# Patient Record
Sex: MALE | Race: WHITE | Employment: OTHER | URBAN - METROPOLITAN AREA
[De-identification: names, ages, dates, MRNs, and addresses within clinical notes are randomized per-mention and may not be internally consistent; named-entity substitution may affect disease eponyms.]

---

## 2018-01-22 PROBLEM — L02.419 ABSCESS OF ELBOW: Status: ACTIVE | Noted: 2018-01-22

## 2018-01-23 ENCOUNTER — CASE MANAGEMENT (OUTPATIENT)
Dept: EMERGENCY DEPT | Age: 33
End: 2018-01-23

## 2019-08-08 ENCOUNTER — APPOINTMENT (OUTPATIENT)
Dept: GENERAL RADIOLOGY | Age: 34
End: 2019-08-08
Payer: COMMERCIAL

## 2019-08-08 ENCOUNTER — HOSPITAL ENCOUNTER (EMERGENCY)
Age: 34
Discharge: HOME | End: 2019-08-08
Payer: COMMERCIAL

## 2019-08-08 VITALS
SYSTOLIC BLOOD PRESSURE: 119 MMHG | TEMPERATURE: 98.1 F | OXYGEN SATURATION: 98 % | BODY MASS INDEX: 22.93 KG/M2 | WEIGHT: 173 LBS | DIASTOLIC BLOOD PRESSURE: 88 MMHG | RESPIRATION RATE: 18 BRPM | HEIGHT: 73 IN | HEART RATE: 75 BPM

## 2019-08-08 DIAGNOSIS — S80.811A INFECTED ABRASION OF RIGHT LOWER EXTREMITY, INITIAL ENCOUNTER: ICD-10-CM

## 2019-08-08 DIAGNOSIS — L03.115 CELLULITIS OF RIGHT LOWER EXTREMITY: Primary | ICD-10-CM

## 2019-08-08 DIAGNOSIS — R60.0 LEG EDEMA: ICD-10-CM

## 2019-08-08 DIAGNOSIS — S81.811A LACERATION OF RIGHT LOWER EXTREMITY, INITIAL ENCOUNTER: ICD-10-CM

## 2019-08-08 DIAGNOSIS — L08.9 INFECTED ABRASION OF RIGHT LOWER EXTREMITY, INITIAL ENCOUNTER: ICD-10-CM

## 2019-08-08 LAB
A/G RATIO: 1 (ref 1.1–2.2)
ALBUMIN SERPL-MCNC: 4.1 G/DL (ref 3.4–5)
ALP BLD-CCNC: 42 U/L (ref 40–129)
ALT SERPL-CCNC: 92 U/L (ref 10–40)
ANION GAP SERPL CALCULATED.3IONS-SCNC: 11 MMOL/L (ref 3–16)
AST SERPL-CCNC: 73 U/L (ref 15–37)
BASOPHILS ABSOLUTE: 0 K/UL (ref 0–0.2)
BASOPHILS RELATIVE PERCENT: 0.3 %
BILIRUB SERPL-MCNC: 2 MG/DL (ref 0–1)
BUN BLDV-MCNC: 13 MG/DL (ref 7–20)
CALCIUM SERPL-MCNC: 9.4 MG/DL (ref 8.3–10.6)
CHLORIDE BLD-SCNC: 101 MMOL/L (ref 99–110)
CO2: 28 MMOL/L (ref 21–32)
CREAT SERPL-MCNC: 0.7 MG/DL (ref 0.9–1.3)
EOSINOPHILS ABSOLUTE: 0 K/UL (ref 0–0.6)
EOSINOPHILS RELATIVE PERCENT: 0.2 %
GFR AFRICAN AMERICAN: >60
GFR NON-AFRICAN AMERICAN: >60
GLOBULIN: 4.3 G/DL
GLUCOSE BLD-MCNC: 103 MG/DL (ref 70–99)
HCT VFR BLD CALC: 35.1 % (ref 40.5–52.5)
HEMOGLOBIN: 12 G/DL (ref 13.5–17.5)
LACTIC ACID: 0.8 MMOL/L (ref 0.4–2)
LYMPHOCYTES ABSOLUTE: 2.7 K/UL (ref 1–5.1)
LYMPHOCYTES RELATIVE PERCENT: 27.6 %
MCH RBC QN AUTO: 34.2 PG (ref 26–34)
MCHC RBC AUTO-ENTMCNC: 34.3 G/DL (ref 31–36)
MCV RBC AUTO: 99.9 FL (ref 80–100)
MONOCYTES ABSOLUTE: 1.5 K/UL (ref 0–1.3)
MONOCYTES RELATIVE PERCENT: 14.9 %
NEUTROPHILS ABSOLUTE: 5.6 K/UL (ref 1.7–7.7)
NEUTROPHILS RELATIVE PERCENT: 57 %
PDW BLD-RTO: 13 % (ref 12.4–15.4)
PLATELET # BLD: 305 K/UL (ref 135–450)
PMV BLD AUTO: 8.3 FL (ref 5–10.5)
POTASSIUM SERPL-SCNC: 3.8 MMOL/L (ref 3.5–5.1)
RBC # BLD: 3.52 M/UL (ref 4.2–5.9)
SODIUM BLD-SCNC: 140 MMOL/L (ref 136–145)
TOTAL PROTEIN: 8.4 G/DL (ref 6.4–8.2)
WBC # BLD: 9.8 K/UL (ref 4–11)

## 2019-08-08 PROCEDURE — 83605 ASSAY OF LACTIC ACID: CPT

## 2019-08-08 PROCEDURE — 6360000002 HC RX W HCPCS: Performed by: NURSE PRACTITIONER

## 2019-08-08 PROCEDURE — 99283 EMERGENCY DEPT VISIT LOW MDM: CPT

## 2019-08-08 PROCEDURE — 6370000000 HC RX 637 (ALT 250 FOR IP): Performed by: NURSE PRACTITIONER

## 2019-08-08 PROCEDURE — 73590 X-RAY EXAM OF LOWER LEG: CPT

## 2019-08-08 PROCEDURE — 85025 COMPLETE CBC W/AUTO DIFF WBC: CPT

## 2019-08-08 PROCEDURE — 96374 THER/PROPH/DIAG INJ IV PUSH: CPT

## 2019-08-08 PROCEDURE — 80053 COMPREHEN METABOLIC PANEL: CPT

## 2019-08-08 PROCEDURE — 73630 X-RAY EXAM OF FOOT: CPT

## 2019-08-08 RX ORDER — CEPHALEXIN 500 MG/1
500 CAPSULE ORAL 4 TIMES DAILY
Qty: 40 CAPSULE | Refills: 0 | Status: SHIPPED | OUTPATIENT
Start: 2019-08-08 | End: 2019-08-18

## 2019-08-08 RX ORDER — SULFAMETHOXAZOLE AND TRIMETHOPRIM 800; 160 MG/1; MG/1
1 TABLET ORAL 2 TIMES DAILY
Qty: 20 TABLET | Refills: 0 | Status: SHIPPED | OUTPATIENT
Start: 2019-08-08 | End: 2019-08-18

## 2019-08-08 RX ORDER — HYDROCODONE BITARTRATE AND ACETAMINOPHEN 5; 325 MG/1; MG/1
1 TABLET ORAL ONCE
Status: COMPLETED | OUTPATIENT
Start: 2019-08-08 | End: 2019-08-08

## 2019-08-08 RX ORDER — CEPHALEXIN 250 MG/1
500 CAPSULE ORAL ONCE
Status: COMPLETED | OUTPATIENT
Start: 2019-08-08 | End: 2019-08-08

## 2019-08-08 RX ORDER — SULFAMETHOXAZOLE AND TRIMETHOPRIM 800; 160 MG/1; MG/1
1 TABLET ORAL ONCE
Status: COMPLETED | OUTPATIENT
Start: 2019-08-08 | End: 2019-08-08

## 2019-08-08 RX ORDER — HYDROCODONE BITARTRATE AND ACETAMINOPHEN 5; 325 MG/1; MG/1
1 TABLET ORAL EVERY 6 HOURS PRN
Qty: 10 TABLET | Refills: 0 | Status: SHIPPED | OUTPATIENT
Start: 2019-08-08 | End: 2019-08-11

## 2019-08-08 RX ORDER — KETOROLAC TROMETHAMINE 10 MG/1
10 TABLET, FILM COATED ORAL EVERY 6 HOURS PRN
Qty: 20 TABLET | Refills: 0 | Status: SHIPPED | OUTPATIENT
Start: 2019-08-08 | End: 2022-05-28

## 2019-08-08 RX ORDER — KETOROLAC TROMETHAMINE 30 MG/ML
30 INJECTION, SOLUTION INTRAMUSCULAR; INTRAVENOUS ONCE
Status: COMPLETED | OUTPATIENT
Start: 2019-08-08 | End: 2019-08-08

## 2019-08-08 RX ADMIN — HYDROCODONE BITARTRATE AND ACETAMINOPHEN 1 TABLET: 5; 325 TABLET ORAL at 21:39

## 2019-08-08 RX ADMIN — Medication 3 ML: at 19:55

## 2019-08-08 RX ADMIN — SULFAMETHOXAZOLE AND TRIMETHOPRIM 1 TABLET: 800; 160 TABLET ORAL at 21:19

## 2019-08-08 RX ADMIN — KETOROLAC TROMETHAMINE 30 MG: 30 INJECTION, SOLUTION INTRAMUSCULAR at 19:55

## 2019-08-08 RX ADMIN — CEPHALEXIN 500 MG: 250 CAPSULE ORAL at 21:19

## 2019-08-08 ASSESSMENT — PAIN DESCRIPTION - DESCRIPTORS: DESCRIPTORS: ACHING

## 2019-08-08 ASSESSMENT — PAIN DESCRIPTION - ORIENTATION: ORIENTATION: RIGHT;LOWER

## 2019-08-08 ASSESSMENT — PAIN SCALES - GENERAL: PAINLEVEL_OUTOF10: 6

## 2019-08-08 ASSESSMENT — PAIN DESCRIPTION - LOCATION: LOCATION: LEG

## 2019-08-08 ASSESSMENT — PAIN DESCRIPTION - PROGRESSION: CLINICAL_PROGRESSION: GRADUALLY WORSENING

## 2019-08-08 ASSESSMENT — PAIN DESCRIPTION - ONSET: ONSET: ON-GOING

## 2019-08-08 ASSESSMENT — PAIN DESCRIPTION - FREQUENCY: FREQUENCY: CONTINUOUS

## 2019-08-08 NOTE — ED PROVIDER NOTES
Evaluated by [id number] [facility] Provider    [facility]  ED    CHIEF COMPLAINT  Leg Swelling (right leg swelling, states over the weekend was in barn and drywall fell onto right leg, ankle swollen, foot is bruised)    HISTORY OF PRESENT ILLNESS  Cliff Barker III is a 37 y.o. male who presents to the ED with complaints of redness/erythema to the right lower extremity. Was working in a barn and a wall fell on his leg and slid down it causing abrasions and laceration. Sunday it started becoming swollen and red. Reports last tetanus vaccine within the past 5 years. Denies fever, reports chills, denies diaphoresis. REports drainage from the bottom wound. Reports vomiting. History of heroin abuse, states clean for a while. The patient is currently rating their pain as 6/10 and describes it as an aching and throbbing type of pain. Treatments tried prior to arrival in the ED: took doxycycline x2 doses. The patient denies other complaints, modifying factors or associated symptoms. The patient arrived to the ED via private car. Patient is accompanied by family who is/are bedside for the visit. Nursing notes reviewed. Past Medical History:   Diagnosis Date    Active smoker     Drug overdose 07/22/2014    Heroin    Restless leg      Past Surgical History:   Procedure Laterality Date    FINGER SURGERY      SPLENECTOMY, TOTAL Bilateral 03/2015     History reviewed. No pertinent family history.   Social History     Socioeconomic History    Marital status: Single     Spouse name: Not on file    Number of children: Not on file    Years of education: Not on file    Highest education level: Not on file   Occupational History    Not on file   Social Needs    Financial resource strain: Not on file    Food insecurity:     Worry: Not on file     Inability: Not on file    Transportation needs:     Medical: Not on file     Non-medical: Not on file   Tobacco Use    Smoking status: Never consistent when reviewing prior results. Lactic acid level is normal at 0.8. X-ray of the right tibia and fibula shows soft tissue swelling without acute osseous abnormality, right foot also shows no acute findings. The right lower leg wound does not require repair at this time due to length of time since it was originally injured. This area was cleansed and dressed per ED nursing staff. I will start the patient on oral Bactrim and Keflex to treat him for cellulitis, I feel discharging him for outpatient treatment is reasonable at this time. Patient does not have a primary care provider, he was given referral upon discharge but I did advise him to follow-up with [facility] in 2 days for a recheck to assure that his symptoms are not worsening. He was given strict ED return precautions. Patient has a history of substance abuse, denies that he is actively using. He was given Toradol here in the ED and reported that this did not help with this pain. I advised him that antibiotics will be the most effective treatment for his pain, after long discussion with the patient I did agreed to discharge him with a short supply of Norco for pain control. Patient was given the following medications or treatments in the ED:   Medications   ketorolac (TORADOL) injection 30 mg (30 mg Intravenous Given 8/8/19 1955)   lidocaine-EPINEPHrine-tetracaine (LET) topical solution 3 mL syringe (3 mLs Topical Given 8/8/19 1955)   cephALEXin (KEFLEX) capsule 500 mg (500 mg Oral Given 8/8/19 2119)   sulfamethoxazole-trimethoprim (BACTRIM DS;SEPTRA DS) 800-160 MG per tablet 1 tablet (1 tablet Oral Given 8/8/19 2119)   HYDROcodone-acetaminophen (NORCO) 5-325 MG per tablet 1 tablet (1 tablet Oral Given 8/8/19 2139)       At this point I do not feel the patient requires further work up and it is reasonable to discharge the patient. Please refer to AVS for further details regarding discharge instructions.     A discussion was

## 2019-09-09 ENCOUNTER — APPOINTMENT (OUTPATIENT)
Dept: GENERAL RADIOLOGY | Age: 34
End: 2019-09-09
Payer: COMMERCIAL

## 2019-09-09 ENCOUNTER — HOSPITAL ENCOUNTER (EMERGENCY)
Age: 34
Discharge: HOME | End: 2019-09-09
Payer: COMMERCIAL

## 2019-09-09 ENCOUNTER — APPOINTMENT (OUTPATIENT)
Dept: CT IMAGING | Age: 34
End: 2019-09-09
Payer: COMMERCIAL

## 2019-09-09 VITALS
TEMPERATURE: 99.6 F | RESPIRATION RATE: 12 BRPM | BODY MASS INDEX: 24.52 KG/M2 | DIASTOLIC BLOOD PRESSURE: 80 MMHG | WEIGHT: 185 LBS | HEIGHT: 73 IN | SYSTOLIC BLOOD PRESSURE: 144 MMHG | HEART RATE: 84 BPM | OXYGEN SATURATION: 99 %

## 2019-09-09 DIAGNOSIS — L03.119 CELLULITIS AND ABSCESS OF LEG: Primary | ICD-10-CM

## 2019-09-09 DIAGNOSIS — L02.419 CELLULITIS AND ABSCESS OF LEG: Primary | ICD-10-CM

## 2019-09-09 DIAGNOSIS — R50.9 ACUTE FEBRILE ILLNESS: ICD-10-CM

## 2019-09-09 LAB
A/G RATIO: 1.1 (ref 1.1–2.2)
ALBUMIN SERPL-MCNC: 3.4 G/DL (ref 3.4–5)
ALP BLD-CCNC: 32 U/L (ref 40–129)
ALT SERPL-CCNC: 69 U/L (ref 10–40)
ANION GAP SERPL CALCULATED.3IONS-SCNC: 9 MMOL/L (ref 3–16)
AST SERPL-CCNC: 52 U/L (ref 15–37)
BASOPHILS ABSOLUTE: 0.2 K/UL (ref 0–0.2)
BASOPHILS RELATIVE PERCENT: 0.9 %
BILIRUB SERPL-MCNC: 1.5 MG/DL (ref 0–1)
BILIRUBIN URINE: NEGATIVE
BLOOD, URINE: NEGATIVE
BUN BLDV-MCNC: 12 MG/DL (ref 7–20)
CALCIUM SERPL-MCNC: 7.5 MG/DL (ref 8.3–10.6)
CHLORIDE BLD-SCNC: 105 MMOL/L (ref 99–110)
CLARITY: CLEAR
CO2: 22 MMOL/L (ref 21–32)
COLOR: YELLOW
CREAT SERPL-MCNC: <0.5 MG/DL (ref 0.9–1.3)
EOSINOPHILS ABSOLUTE: 0 K/UL (ref 0–0.6)
EOSINOPHILS RELATIVE PERCENT: 0.3 %
GFR AFRICAN AMERICAN: >60
GFR NON-AFRICAN AMERICAN: >60
GLOBULIN: 3.1 G/DL
GLUCOSE BLD-MCNC: 75 MG/DL (ref 70–99)
GLUCOSE URINE: NEGATIVE MG/DL
HCT VFR BLD CALC: 40.5 % (ref 40.5–52.5)
HEMOGLOBIN: 13.7 G/DL (ref 13.5–17.5)
KETONES, URINE: NEGATIVE MG/DL
LACTIC ACID: 1.2 MMOL/L (ref 0.4–2)
LEUKOCYTE ESTERASE, URINE: NEGATIVE
LYMPHOCYTES ABSOLUTE: 2.8 K/UL (ref 1–5.1)
LYMPHOCYTES RELATIVE PERCENT: 17.3 %
MCH RBC QN AUTO: 34.3 PG (ref 26–34)
MCHC RBC AUTO-ENTMCNC: 33.8 G/DL (ref 31–36)
MCV RBC AUTO: 101.4 FL (ref 80–100)
MICROSCOPIC EXAMINATION: ABNORMAL
MONOCYTES ABSOLUTE: 1.5 K/UL (ref 0–1.3)
MONOCYTES RELATIVE PERCENT: 9 %
NEUTROPHILS ABSOLUTE: 11.9 K/UL (ref 1.7–7.7)
NEUTROPHILS RELATIVE PERCENT: 72.5 %
NITRITE, URINE: NEGATIVE
PDW BLD-RTO: 13.7 % (ref 12.4–15.4)
PH UA: 8.5 (ref 5–8)
PLATELET # BLD: 323 K/UL (ref 135–450)
PMV BLD AUTO: 8.8 FL (ref 5–10.5)
POTASSIUM SERPL-SCNC: 3.4 MMOL/L (ref 3.5–5.1)
PROTEIN UA: NEGATIVE MG/DL
RBC # BLD: 3.99 M/UL (ref 4.2–5.9)
REASON FOR REJECTION: NORMAL
REJECTED TEST: NORMAL
SODIUM BLD-SCNC: 136 MMOL/L (ref 136–145)
SPECIFIC GRAVITY UA: 1.01 (ref 1–1.03)
TOTAL PROTEIN: 6.5 G/DL (ref 6.4–8.2)
URINE REFLEX TO CULTURE: ABNORMAL
URINE TYPE: ABNORMAL
UROBILINOGEN, URINE: 0.2 E.U./DL
WBC # BLD: 16.4 K/UL (ref 4–11)

## 2019-09-09 PROCEDURE — 99284 EMERGENCY DEPT VISIT MOD MDM: CPT

## 2019-09-09 PROCEDURE — 2580000003 HC RX 258: Performed by: NURSE PRACTITIONER

## 2019-09-09 PROCEDURE — 73701 CT LOWER EXTREMITY W/DYE: CPT

## 2019-09-09 PROCEDURE — 96361 HYDRATE IV INFUSION ADD-ON: CPT

## 2019-09-09 PROCEDURE — 2500000003 HC RX 250 WO HCPCS: Performed by: NURSE PRACTITIONER

## 2019-09-09 PROCEDURE — 96365 THER/PROPH/DIAG IV INF INIT: CPT

## 2019-09-09 PROCEDURE — 6360000004 HC RX CONTRAST MEDICATION: Performed by: NURSE PRACTITIONER

## 2019-09-09 PROCEDURE — 6370000000 HC RX 637 (ALT 250 FOR IP): Performed by: NURSE PRACTITIONER

## 2019-09-09 PROCEDURE — 73590 X-RAY EXAM OF LOWER LEG: CPT

## 2019-09-09 PROCEDURE — 83605 ASSAY OF LACTIC ACID: CPT

## 2019-09-09 PROCEDURE — 80053 COMPREHEN METABOLIC PANEL: CPT

## 2019-09-09 PROCEDURE — 87040 BLOOD CULTURE FOR BACTERIA: CPT

## 2019-09-09 PROCEDURE — 4500000024 HC ED LEVEL 4 PROCEDURE

## 2019-09-09 PROCEDURE — 81003 URINALYSIS AUTO W/O SCOPE: CPT

## 2019-09-09 PROCEDURE — 85025 COMPLETE CBC W/AUTO DIFF WBC: CPT

## 2019-09-09 RX ORDER — 0.9 % SODIUM CHLORIDE 0.9 %
1000 INTRAVENOUS SOLUTION INTRAVENOUS ONCE
Status: COMPLETED | OUTPATIENT
Start: 2019-09-09 | End: 2019-09-09

## 2019-09-09 RX ORDER — IBUPROFEN 600 MG/1
600 TABLET ORAL ONCE
Status: COMPLETED | OUTPATIENT
Start: 2019-09-09 | End: 2019-09-09

## 2019-09-09 RX ORDER — CLINDAMYCIN HYDROCHLORIDE 150 MG/1
300 CAPSULE ORAL 4 TIMES DAILY
Qty: 56 CAPSULE | Refills: 0 | Status: SHIPPED | OUTPATIENT
Start: 2019-09-09 | End: 2019-09-16

## 2019-09-09 RX ORDER — HYDROCODONE BITARTRATE AND ACETAMINOPHEN 5; 325 MG/1; MG/1
1 TABLET ORAL EVERY 6 HOURS PRN
Qty: 6 TABLET | Refills: 0 | Status: SHIPPED | OUTPATIENT
Start: 2019-09-09 | End: 2019-09-12

## 2019-09-09 RX ORDER — ACETAMINOPHEN 500 MG
1000 TABLET ORAL ONCE
Status: COMPLETED | OUTPATIENT
Start: 2019-09-09 | End: 2019-09-09

## 2019-09-09 RX ADMIN — IBUPROFEN 600 MG: 600 TABLET, FILM COATED ORAL at 16:13

## 2019-09-09 RX ADMIN — ACETAMINOPHEN 1000 MG: 500 TABLET ORAL at 16:13

## 2019-09-09 RX ADMIN — IOPAMIDOL 75 ML: 755 INJECTION, SOLUTION INTRAVENOUS at 19:16

## 2019-09-09 RX ADMIN — SODIUM CHLORIDE 1000 ML: 9 INJECTION, SOLUTION INTRAVENOUS at 16:59

## 2019-09-09 RX ADMIN — DEXTROSE MONOHYDRATE 600 MG: 50 INJECTION, SOLUTION INTRAVENOUS at 16:58

## 2019-09-09 ASSESSMENT — ENCOUNTER SYMPTOMS
NAUSEA: 0
WHEEZING: 0
BACK PAIN: 0
COUGH: 0
DIARRHEA: 0
SHORTNESS OF BREATH: 0
COLOR CHANGE: 1
SORE THROAT: 0
ABDOMINAL PAIN: 0
VOMITING: 0

## 2019-09-09 ASSESSMENT — PAIN SCALES - GENERAL
PAINLEVEL_OUTOF10: 8
PAINLEVEL_OUTOF10: 8
PAINLEVEL_OUTOF10: 7

## 2019-09-09 NOTE — ED PROVIDER NOTES
infection. Metabolic panel shows no sniffing letter light disturbances or renal insufficiency. Liver functions are slightly elevated with a total bilirubin of 1.5, ALT of 69 AST of 52. He is hypocalcemic at 7.5. Tib-fib x-ray shows unremarkable tib-fib radiographs, no acute fracture dislocation or foreign body. I then ordered a CT scan to evaluate for possible osteomyelitis specifically because of the elevated white count, fever and tachycardia. However, the CT scan shows no evidence of acute osseous mellitus, I spoke with the patient at length about being admitted for IV antibiotics versus going home. Patient states he has to go to work as he has a father of a little girl. States that he needs his job to make money or he may lose his job. It was strongly suggested the patient be admitted however we discussed the benefits versus risk. Patient agreed that he would not take care of this wound efficiently along with following up with primary care doctor and the wound clinic. However, at this time of no concerns for acute cellulitis or sepsis. I did educate him that he needs to take Tylenol Motrin for the fever and increase his fluids. Therefore, shared medical decision was made between the patient myself we agreed he can be discharged home with outpatient follow-up. He was discharged home with referral to PCP and the wound care clinic, educated to call first thing in the morning for reevaluation. Educated to return immediately to the ER for worsening symptoms. I did instruct him that I would give him only 6 tablets of pain medicine, I educated him that this would put him at risk for relapsing on heroin, and his significant other states that she will only administer tablets when he necessary leads it however they will use Tylenol Motrin instead. He was discharged home with clindamycin and wound care instructions. Educated take clindamycin as prescribed.   Educated return immediately for worsening

## 2019-09-10 NOTE — ED NOTES
Patient removed PIV after stating he has waited long enough for someone to come take it out. Dressing applied per patient.      Bart Snyder RN  09/09/19 1461

## 2019-09-14 LAB
BLOOD CULTURE, ROUTINE: NORMAL
CULTURE, BLOOD 2: NORMAL

## 2022-02-19 ENCOUNTER — APPOINTMENT (OUTPATIENT)
Dept: CT IMAGING | Age: 37
End: 2022-02-19
Payer: COMMERCIAL

## 2022-02-19 ENCOUNTER — HOSPITAL ENCOUNTER (EMERGENCY)
Age: 37
Discharge: HOME | End: 2022-02-19
Attending: STUDENT IN AN ORGANIZED HEALTH CARE EDUCATION/TRAINING PROGRAM
Payer: COMMERCIAL

## 2022-02-19 VITALS
OXYGEN SATURATION: 94 % | TEMPERATURE: 98.4 F | HEIGHT: 73 IN | RESPIRATION RATE: 20 BRPM | BODY MASS INDEX: 23.67 KG/M2 | SYSTOLIC BLOOD PRESSURE: 144 MMHG | WEIGHT: 178.57 LBS | HEART RATE: 135 BPM | DIASTOLIC BLOOD PRESSURE: 95 MMHG

## 2022-02-19 DIAGNOSIS — S00.93XA CONTUSION OF HEAD, UNSPECIFIED PART OF HEAD, INITIAL ENCOUNTER: Primary | ICD-10-CM

## 2022-02-19 PROCEDURE — 99285 EMERGENCY DEPT VISIT HI MDM: CPT

## 2022-02-19 PROCEDURE — 70450 CT HEAD/BRAIN W/O DYE: CPT

## 2022-02-19 PROCEDURE — 6370000000 HC RX 637 (ALT 250 FOR IP): Performed by: STUDENT IN AN ORGANIZED HEALTH CARE EDUCATION/TRAINING PROGRAM

## 2022-02-19 RX ORDER — 0.9 % SODIUM CHLORIDE 0.9 %
2000 INTRAVENOUS SOLUTION INTRAVENOUS ONCE
Status: DISCONTINUED | OUTPATIENT
Start: 2022-02-19 | End: 2022-02-19

## 2022-02-19 RX ORDER — LORAZEPAM 2 MG/ML
2 INJECTION INTRAMUSCULAR ONCE
Status: DISCONTINUED | OUTPATIENT
Start: 2022-02-19 | End: 2022-02-19

## 2022-02-19 RX ORDER — ACETAMINOPHEN 325 MG/1
650 TABLET ORAL ONCE
Status: COMPLETED | OUTPATIENT
Start: 2022-02-19 | End: 2022-02-19

## 2022-02-19 RX ORDER — DIPHENHYDRAMINE HYDROCHLORIDE 50 MG/ML
50 INJECTION INTRAMUSCULAR; INTRAVENOUS ONCE
Status: DISCONTINUED | OUTPATIENT
Start: 2022-02-19 | End: 2022-02-19

## 2022-02-19 RX ADMIN — ACETAMINOPHEN 650 MG: 325 TABLET ORAL at 07:29

## 2022-02-19 ASSESSMENT — PAIN SCALES - GENERAL
PAINLEVEL_OUTOF10: 7
PAINLEVEL_OUTOF10: 7

## 2022-02-19 ASSESSMENT — PAIN DESCRIPTION - PAIN TYPE: TYPE: ACUTE PAIN

## 2022-02-19 ASSESSMENT — PAIN DESCRIPTION - DESCRIPTORS: DESCRIPTORS: ACHING

## 2022-02-19 ASSESSMENT — PAIN DESCRIPTION - LOCATION: LOCATION: HEAD

## 2022-02-19 NOTE — Clinical Note
Juan Nicholson was seen and treated in our emergency department on 2/19/2022. He may return to work on 02/21/2022. If you have any questions or concerns, please don't hesitate to call.       Asuncion Snow MD

## 2022-02-19 NOTE — ED NOTES
Patient states he is unsure when he had his last tetanus shot, states he does not want one today.      Ashleigh Lee RN  02/19/22 2243

## 2022-02-19 NOTE — ED NOTES
Patient noted to have scratched open a sore on the right forehead. Dressing applied at this time.      Libertad Hammonds RN  02/19/22 [id number]

## 2022-02-19 NOTE — ED PROVIDER NOTES
Primary Care Physician: No primary care provider on file. Attending Physician: No att. providers found     History   Chief Complaint   Patient presents with    Head Injury     States a carmen was kicking a door shut with laundry and hit him with the door by accident about 5 days ago. Patient dropped off by PD. Movments erratic on arrival.        TAYO Henderson  is a 43 y.o. male 3 of drug abuse who presents complaining of falling and hitting his head. Patient stated that he went to work with his supervisor when he got out of the car he fell and hit his head. He does have a abrasion on the right forehead. He has been moving erratically which she stated that his because of his restless legs. Denies any fevers chills nausea vomiting chest pain or shortness of breath. Stated that he did do PCP and methamphetamine 5 days ago. Past Medical History:   Diagnosis Date    Active smoker     Drug overdose 07/22/2014    Heroin    Restless leg         Past Surgical History:   Procedure Laterality Date    FINGER SURGERY      SPLENECTOMY, TOTAL Bilateral 03/2015        No family history on file.      Social History     Socioeconomic History    Marital status: Single     Spouse name: Not on file    Number of children: Not on file    Years of education: Not on file    Highest education level: Not on file   Occupational History    Not on file   Tobacco Use    Smoking status: Never Smoker    Smokeless tobacco: Never Used   Substance and Sexual Activity    Alcohol use: No    Drug use: Yes     Types: Marijuana (Weed), Methamphetamines (Crystal Meth)     Comment: 3 days ago    Sexual activity: Yes     Partners: Female   Other Topics Concern    Not on file   Social History Narrative    Not on file     Social Determinants of Health     Financial Resource Strain:     Difficulty of Paying Living Expenses: Not on file   Food Insecurity:     Worried About Running Out of Food in the Last Year: Not on file  Ran Out of Food in the Last Year: Not on file   Transportation Needs:     Lack of Transportation (Medical): Not on file    Lack of Transportation (Non-Medical): Not on file   Physical Activity:     Days of Exercise per Week: Not on file    Minutes of Exercise per Session: Not on file   Stress:     Feeling of Stress : Not on file   Social Connections:     Frequency of Communication with Friends and Family: Not on file    Frequency of Social Gatherings with Friends and Family: Not on file    Attends Jehovah's witness Services: Not on file    Active Member of [address] [place] [address] [employer] or Organizations: Not on file    Attends Club or Organization Meetings: Not on file    Marital Status: Not on file   Intimate Partner Violence:     Fear of Current or Ex-Partner: Not on file    Emotionally Abused: Not on file    Physically Abused: Not on file    Sexually Abused: Not on file   Housing Stability:     Unable to Pay for Housing in the Last Year: Not on file    Number of [place] in the Last Year: Not on file    Unstable Housing in the Last Year: Not on file        Review of Systems   10 total systems reviewed and found to be negative unless otherwise noted in HPI     Physical Exam   BP (!) 144/95   Pulse 135 Comment: MD aware; Patient agitated and continues to flail in room. Temp 98.4 °F (36.9 °C) (Oral)   Resp 20   Ht 6' 1\" (1.854 m)   Wt 178 lb 9.2 oz (81 kg)   SpO2 94%   BMI 23.56 kg/m²      CONSTITUTIONAL: Well appearing, in no acute distress   HEAD: atraumatic, normocephalic   EYES: PERRL, No injection, discharge or scleral icterus. ENT: Moist mucous membranes. NECK: Normal ROM, NO LAD   CARDIOVASCULAR: Regular rate and rhythm. No murmurs or gallop. PULMONARY/CHEST: Airway patent. No retractions. Breath sounds clear with good air entry bilaterally. ABDOMEN: Soft, Non-distended and non-tender, without guarding or rebound.    SKIN: Acyanotic, warm, dry   MUSCULOSKELETAL: No swelling, tenderness or deformity NEUROLOGICAL: Awake and oriented x 3. Pulses intact. Grossly nonfocal   Nursing note and vitals reviewed. ED Course & Medical Decision Making   Medications   acetaminophen (TYLENOL) tablet 650 mg (650 mg Oral Given 2/19/22 0729)      Labs Reviewed - No data to display   CT Head WO Contrast   Final Result   1. No acute gross intracranial abnormality. PROCEDURES:   Procedures    ASSESSMENT AND PLAN:  [id number] III is a 43 y.o. male presents complaining of a fall hitting his head. On exam patient was very restless and because of that his heart rate was elevated in the 130s. I did obtain a CT scan of his head which was negative. He remained neurologically intact alert oriented and decisional.  His vitals were within normal limits apart from elevated heart rate which is probably from his drug use and the fact that he was very restless. I did recommend that he get some fluids and relaxing medicines but he declined he stated that he wanted to go home. At this point, despite the fact that he had been doing drugs he was alert oriented decisional and competent to make that decision. He was discharged home in stable condition. .    ClINICAL IMPRESSION:  1. Contusion of head, unspecified part of head, initial encounter          PATIENT REFERRED TO:  Nina Nelson  [phone]  Schedule an appointment as soon as possible for a visit in 2 days        DISCHARGE MEDICATIONS:  Discharge Medication List as of 2/19/2022  8:38 AM        DISCONTINUED MEDICATIONS:  Discharge Medication List as of 2/19/2022  8:38 AM        DISPOSITION Decision To Discharge 02/19/2022 08:38:06 AM  -We have instructed the patient, (Alysha Henderson) to return to the ED or call his PCP if his pain/symptoms worsen. -Findings and recommendations explained to patient.  He expressed understanding and agreed with the plan.    ___________________________________________________________________________________  _________________________________________________________________________________________  This record is transcribed utilizing voice recognition technology. There are inherent limitations in this technology. In addition, there may be limitations in editing of this report. If there are any discrepancies, please contact me directly.         Lili Sellers MD  02/19/22 7434

## 2022-02-19 NOTE — ED NOTES
Patient ambulatory from ED. AVS provided and discussed with patient. All questions answered. Patient verbalizes understanding of discharge instructions. Respirations even and easy. No obvious distress at this time.        Artem Grey RN  02/19/22 4205

## 2022-02-19 NOTE — ED NOTES
Patient becoming agitated and states \"I want to get out of here, I can't stay another 2 hours. \" MD notified. Per MD, patient is ok to discharge at this time. Patient provided with 1L of PO water prior to discharge per MD order.      Brianna Vegas RN  02/19/22 5414

## 2022-02-19 NOTE — ED TRIAGE NOTES
Patient brought to ED by PD. Patient states a neighbor was kicking a door shut with laundry and hit him with the door by accident about 5 days ago. Patient dropped off by PD. Movments erratic on arrival, flailing limbs and unable to sit still. Patient reports using meth 3 days ago and states he has had about 3 hours of sleep since then. Patient cooperative on arrival. No obvious medical or respiratory distress at this time. Security at bedside.

## 2022-05-28 ENCOUNTER — HOSPITAL ENCOUNTER (EMERGENCY)
Age: 37
Discharge: HOME | End: 2022-05-28
Attending: EMERGENCY MEDICINE
Payer: COMMERCIAL

## 2022-05-28 VITALS
SYSTOLIC BLOOD PRESSURE: 143 MMHG | DIASTOLIC BLOOD PRESSURE: 102 MMHG | TEMPERATURE: 98 F | BODY MASS INDEX: 23.86 KG/M2 | HEART RATE: 87 BPM | OXYGEN SATURATION: 98 % | RESPIRATION RATE: 16 BRPM | WEIGHT: 180 LBS | HEIGHT: 73 IN

## 2022-05-28 DIAGNOSIS — R03.0 ELEVATED BLOOD PRESSURE READING: ICD-10-CM

## 2022-05-28 DIAGNOSIS — K04.7 DENTAL INFECTION: Primary | ICD-10-CM

## 2022-05-28 DIAGNOSIS — K02.9 PAIN DUE TO DENTAL CARIES: ICD-10-CM

## 2022-05-28 PROCEDURE — 6370000000 HC RX 637 (ALT 250 FOR IP): Performed by: EMERGENCY MEDICINE

## 2022-05-28 PROCEDURE — 99283 EMERGENCY DEPT VISIT LOW MDM: CPT

## 2022-05-28 RX ORDER — AMOXICILLIN 500 MG/1
500 CAPSULE ORAL ONCE
Status: COMPLETED | OUTPATIENT
Start: 2022-05-28 | End: 2022-05-28

## 2022-05-28 RX ORDER — IBUPROFEN 600 MG/1
600 TABLET ORAL ONCE
Status: COMPLETED | OUTPATIENT
Start: 2022-05-28 | End: 2022-05-28

## 2022-05-28 RX ORDER — AMOXICILLIN 500 MG/1
500 CAPSULE ORAL 2 TIMES DAILY
Qty: 28 CAPSULE | Refills: 0 | Status: SHIPPED | OUTPATIENT
Start: 2022-05-28 | End: 2022-06-11

## 2022-05-28 RX ADMIN — IBUPROFEN 600 MG: 600 TABLET ORAL at 01:43

## 2022-05-28 RX ADMIN — AMOXICILLIN 500 MG: 500 CAPSULE ORAL at 01:43

## 2022-05-28 ASSESSMENT — ENCOUNTER SYMPTOMS
EYE REDNESS: 0
VOMITING: 0
SINUS PAIN: 0
FACIAL SWELLING: 0
TROUBLE SWALLOWING: 0
SINUS PRESSURE: 0
PHOTOPHOBIA: 0
SORE THROAT: 0
EYE PAIN: 0
SHORTNESS OF BREATH: 0

## 2022-05-28 ASSESSMENT — PAIN - FUNCTIONAL ASSESSMENT
PAIN_FUNCTIONAL_ASSESSMENT: NONE - DENIES PAIN
PAIN_FUNCTIONAL_ASSESSMENT: NONE - DENIES PAIN

## 2022-05-28 ASSESSMENT — PAIN SCALES - GENERAL: PAINLEVEL_OUTOF10: 7

## 2022-05-28 NOTE — ED PROVIDER NOTES
[facility]        Pt Name: Michael Zambrano  MRN: [id number]  [place] 1985  Date of evaluation: 5/28/2022  Provider: Flory Sparrow MD  PCP: No primary care provider on file. CHIEF COMPLAINT       Chief Complaint   Patient presents with    Dental Pain     Pt just left the [facility], c/o dental pain       HISTORY OFPRESENT ILLNESS   (Location/Symptom, Timing/Onset, Context/Setting, Quality, Duration, Modifying Factors,Severity)  Note limiting factors. Michael Zambrano is a 42 y.o. male presenting today due to concern for developing a tooth ache starting today after eating some candy similar to 3 weeks ago when he was on amoxicillin and ultimately that pain had resolved until today. The pain does radiate towards his left ear. He denies any neck pain or trouble breathing or swallowing. No chest pain or shortness of breath. No visual changes. He has a tooth to the left lower jaw that he knows needs to be pulled and he plans on following up with his dentist this upcoming week to have this done. Other than concern for tooth ache, he has no other complaints at this time. REVIEW OF SYSTEMS    (2-9 systems for level 4, 10 or more for level 5)     Review of Systems   Constitutional: Negative for chills, diaphoresis, fatigue and fever. HENT: Positive for dental problem (left lower jaw) and ear pain (radiating from left jaw). Negative for congestion, facial swelling, sinus pressure, sinus pain, sore throat and trouble swallowing. Eyes: Negative for photophobia, pain, redness and visual disturbance. Respiratory: Negative for shortness of breath. Cardiovascular: Negative for chest pain. Gastrointestinal: Negative for vomiting. Musculoskeletal: Negative for neck pain and neck stiffness. Skin: Negative for rash and wound. Neurological: Negative for headaches.    Psychiatric/Behavioral: The patient is not nervous/anxious. Positives and Pertinent negatives as per HPI. PASTMEDICAL HISTORY     Past Medical History:   Diagnosis Date    Active smoker     Drug overdose 07/22/2014    Heroin    Restless leg          SURGICAL HISTORY       Past Surgical History:   Procedure Laterality Date    FINGER SURGERY      SPLENECTOMY, TOTAL Bilateral 03/2015         CURRENT MEDICATIONS       Discharge Medication List as of 5/28/2022  1:41 AM          ALLERGIES     Methylphenidate and Codeine    FAMILY HISTORY     History reviewed. No pertinent family history. SOCIAL HISTORY       Social History     Socioeconomic History    Marital status: Single     Spouse name: None    Number of children: None    Years of education: None    Highest education level: None   Occupational History    None   Tobacco Use    Smoking status: Never Smoker    Smokeless tobacco: Never Used   Substance and Sexual Activity    Alcohol use: No    Drug use: Yes     Types: Marijuana (Weed), Methamphetamines (Crystal Meth)     Comment: 3 days ago    Sexual activity: Yes     Partners: Female   Other Topics Concern    None   Social History Narrative    None     Social Determinants of Health     Financial Resource Strain:     Difficulty of Paying Living Expenses: Not on file   Food Insecurity:     Worried About Running Out of Food in the Last Year: Not on file    Tonia of Food in the Last Year: Not on file   Transportation Needs:     Lack of Transportation (Medical): Not on file    Lack of Transportation (Non-Medical):  Not on file   Physical Activity:     Days of Exercise per Week: Not on file    Minutes of Exercise per Session: Not on file   Stress:     Feeling of Stress : Not on file   Social Connections:     Frequency of Communication with Friends and Family: Not on file    Frequency of Social Gatherings with Friends and Family: Not on file    Attends Anabaptist Services: Not on file   [employer] of Clubs or Organizations: Not on file    Attends Club or Organization Meetings: Not on file    Marital Status: Not on file   Intimate Partner Violence:     Fear of Current or Ex-Partner: Not on file    Emotionally Abused: Not on file    Physically Abused: Not on file    Sexually Abused: Not on file   Housing Stability:     Unable to Pay for Housing in the Last Year: Not on file    Number of Doris in the Last Year: Not on file    Unstable Housing in the Last Year: Not on file       SCREENINGS    [place] Coma Scale  Eye Opening: Spontaneous  Best Verbal Response: Oriented  Best Motor Response: Obeys commands  Vero Coma Scale Score: 15           PHYSICAL EXAM    (up to 7 for level 4, 8 or more for level 5)     ED Triage Vitals   BP Temp Temp src Pulse Resp SpO2 Height Weight   -- -- -- -- -- -- -- --       Physical Exam  Vitals and nursing note reviewed. Constitutional:       General: He is awake. He is not in acute distress. Appearance: Normal appearance. He is well-developed, well-groomed and normal weight. He is not ill-appearing, toxic-appearing or diaphoretic. Interventions: He is not intubated. HENT:      Head: Normocephalic and atraumatic. No raccoon eyes, Bryan's sign, abrasion, contusion, masses, right periorbital erythema, left periorbital erythema or laceration. Hair is normal.      Jaw: Tenderness (minimal near tooth number 21) present. No trismus, swelling, pain on movement or malocclusion. Right Ear: External ear normal. No mastoid tenderness. Left Ear: External ear normal. No mastoid tenderness. Nose: Nose normal. No congestion or rhinorrhea. Right Sinus: No maxillary sinus tenderness or frontal sinus tenderness. Left Sinus: No maxillary sinus tenderness or frontal sinus tenderness. Mouth/Throat:      Lips: Pink. No lesions. Mouth: Mucous membranes are moist. No injury, lacerations, oral lesions or angioedema. Dentition: Abnormal dentition. Does not have dentures. Dental tenderness and dental caries present. No gingival swelling or dental abscesses. Pharynx: Oropharynx is clear. No oropharyngeal exudate or posterior oropharyngeal erythema. Eyes:      General: Lids are normal. No scleral icterus. Right eye: No discharge. Left eye: No discharge. Extraocular Movements: Extraocular movements intact. Right eye: Normal extraocular motion and no nystagmus. Left eye: Normal extraocular motion and no nystagmus. Conjunctiva/sclera: Conjunctivae normal.      Pupils: Pupils are equal, round, and reactive to light. Pupils are equal.      Right eye: Pupil is round, reactive and not sluggish. Left eye: Pupil is round, reactive and not sluggish. Slit lamp exam:     Right eye: No photophobia. Left eye: No photophobia. Neck:      Trachea: Trachea and phonation normal. No tracheal tenderness. Cardiovascular:      Rate and Rhythm: Normal rate and regular rhythm. Pulses: Normal pulses. Radial pulses are 2+ on the right side. Pulmonary:      Effort: Pulmonary effort is normal. No tachypnea, bradypnea, accessory muscle usage, prolonged expiration, respiratory distress or retractions. He is not intubated. Breath sounds: Normal breath sounds and air entry. No stridor. No wheezing, rhonchi or rales. Abdominal:      General: Abdomen is flat. Musculoskeletal:         General: No deformity. Cervical back: Full passive range of motion without pain, normal range of motion and neck supple. No edema, erythema, signs of trauma, rigidity, torticollis, tenderness or crepitus. No pain with movement, spinous process tenderness or muscular tenderness. Normal range of motion. Lymphadenopathy:      Cervical: No cervical adenopathy. Skin:     General: Skin is warm and dry. Findings: No erythema or rash.       Comments: No erythema or rash noted to face or neck    Neurological:      General: No focal deficit present. Mental Status: He is alert and oriented to person, place, and time. Mental status is at baseline. Psychiatric:         Attention and Perception: Attention normal.         Mood and Affect: Mood and affect normal.         Speech: Speech normal. Speech is not slurred. Behavior: Behavior normal. Behavior is cooperative. DIAGNOSTIC RESULTS   :    Labs Reviewed - No data to display    All other labs were within normal range or not returned asof this dictation. EKG: All EKG's are interpreted by the Emergency Department Physician who either signs or Co-signs this chart in the absence of a cardiologist.        RADIOLOGY:   Non-plain film images such as CT, Ultrasound and MRI are read by the radiologist. Cleophus Ahr images are visualized and preliminarily interpreted by the  ED Provider with the belowfindings:        Interpretation per the Radiologist below, if available at the time of this note:    No orders to display         PROCEDURES   Unless otherwise noted below, none     Procedures    CRITICAL CARE TIME   N/A    CONSULTS:  None    EMERGENCY DEPARTMENT COURSE and DIFFERENTIAL DIAGNOSIS/MDM:   Vitals:    Vitals:    05/28/22 0121   BP: (!) 143/102   Pulse: 87   Resp: 16   Temp: 98 °F (36.7 °C)   TempSrc: Oral   SpO2: 98%   Weight: 180 lb (81.6 kg)   Height: 6' 1\" (1.854 m)       Patient was given the following medications:  Medications   amoxicillin (AMOXIL) capsule 500 mg (500 mg Oral Given 5/28/22 0143)   ibuprofen (ADVIL;MOTRIN) tablet 600 mg (600 mg Oral Given 5/28/22 0143)     Patient was evaluated having dental pain returning today similar to whenever he thinks he had a tooth infection 3 weeks ago that improved with amoxicillin. He denies any trouble opening or closing his mouth or trouble breathing or swallowing and at this time I have very low suspicion for Yonatan's angina or other serious pathology at this time.   He denies any chest pain or shortness of breath and story not suggestive of acute coronary syndrome. His blood pressure was elevated which is most likely due to pain and I did tell him to have this repeated when his pain improves over the next 1 to 2 weeks and if still elevated, he may need medication for this. He was started on amoxicillin since this did help him 3 weeks ago and he plans to follow-up with his dentist over the next few days once the holiday weekend is over for possible tooth extraction. He knows to return to the ED sooner for worsening dental pain with trouble breathing or swallowing, new onset chest pain, facial swelling, or any other concerns. He was well-appearing and in no acute distress at time of discharge and felt comfortable with this plan. The patient tolerated their visit well. The patient and / or the family were informed of the results of any tests, a time was given to answer questions. FINAL IMPRESSION      1. Dental infection    2. Pain due to dental caries    3.  Elevated blood pressure reading          DISPOSITION/PLAN   DISPOSITION Decision To Discharge 05/28/2022 01:34:39 AM      [facility]:  [facility]  [address] [place] [address] [address]  Go to   If symptoms worsen    [facility]) Pre-Services  [phone]  In 1 week      Your dentist    Schedule an appointment as soon as possible for a visit on 5/31/2022        DISCHARGEMEDICATIONS:  Discharge Medication List as of 5/28/2022  1:41 AM      START taking these medications    Details   amoxicillin (AMOXIL) 500 mg capsule Take 1 capsule by mouth 2 times daily for 14 days, Disp-28 capsule, R-0Print             DISCONTINUED MEDICATIONS:  Discharge Medication List as of 5/28/2022  1:41 AM      STOP taking these medications       ketorolac (TORADOL) 10 MG tablet Comments:   Reason for Stopping:                      (Please note that portions of this note were completed with a voicerecognition program.  Efforts were made to edit the dictations but occasionally words are mis-transcribed.)    Giovanna Duran MD (electronically signed)            Giovanna Duran MD  05/28/22 1746

## 2023-07-07 ENCOUNTER — NURSE TRIAGE (OUTPATIENT)
Dept: OTHER | Facility: CLINIC | Age: 38
End: 2023-07-07

## 2023-07-07 NOTE — TELEPHONE ENCOUNTER
Location of patient: [place]    Received call from Emerald Chen at [facility] with [employer]. Subjective: Caller states \"I have right testicle pain and issues with keeping erection \"     Current Symptoms:     Onset: 2 months ago;     Associated Symptoms:     Pain Severity: 7/10;  intermittent only happens during intercourse    Temperature:      What has been tried:     LMP: NA Pregnant: NA    Recommended disposition: See PCP within 3 Days    Care advice provided, patient verbalizes understanding; denies any other questions or concerns; instructed to call back for any new or worsening symptoms. Patient/Caller agrees with recommended disposition; writer provided warm transfer to [facility] at [facility] for appointment scheduling    Attention Provider: Thank you for allowing me to participate in the care of your patient. The patient was connected to triage in response to information provided to the ECC/PSC. Please do not respond through this encounter as the response is not directed to a shared pool.       Reason for Disposition   Brief pain in scrotum or testicle and present < 1 hour and recurrent (NO swelling)    Protocols used: Scrotal Pain-ADULT-[place]